# Patient Record
Sex: MALE | Race: WHITE | URBAN - METROPOLITAN AREA
[De-identification: names, ages, dates, MRNs, and addresses within clinical notes are randomized per-mention and may not be internally consistent; named-entity substitution may affect disease eponyms.]

---

## 2017-10-10 ENCOUNTER — OUTPATIENT (OUTPATIENT)
Dept: OUTPATIENT SERVICES | Facility: HOSPITAL | Age: 47
LOS: 1 days | End: 2017-10-10

## 2017-10-31 ENCOUNTER — OUTPATIENT (OUTPATIENT)
Dept: OUTPATIENT SERVICES | Facility: HOSPITAL | Age: 47
LOS: 1 days | End: 2017-10-31

## 2017-11-15 PROBLEM — Z00.00 ENCOUNTER FOR PREVENTIVE HEALTH EXAMINATION: Status: ACTIVE | Noted: 2017-11-15

## 2017-11-21 ENCOUNTER — OUTPATIENT (OUTPATIENT)
Dept: OUTPATIENT SERVICES | Facility: HOSPITAL | Age: 47
LOS: 1 days | End: 2017-11-21

## 2017-12-18 ENCOUNTER — RECORD ABSTRACTING (OUTPATIENT)
Age: 47
End: 2017-12-18

## 2017-12-19 ENCOUNTER — APPOINTMENT (OUTPATIENT)
Dept: CARDIOLOGY | Facility: CLINIC | Age: 47
End: 2017-12-19
Payer: COMMERCIAL

## 2017-12-19 VITALS
BODY MASS INDEX: 30.06 KG/M2 | WEIGHT: 210 LBS | SYSTOLIC BLOOD PRESSURE: 106 MMHG | HEIGHT: 70 IN | DIASTOLIC BLOOD PRESSURE: 72 MMHG | HEART RATE: 68 BPM

## 2017-12-19 DIAGNOSIS — Z78.9 OTHER SPECIFIED HEALTH STATUS: ICD-10-CM

## 2017-12-19 DIAGNOSIS — Z87.891 PERSONAL HISTORY OF NICOTINE DEPENDENCE: ICD-10-CM

## 2017-12-19 DIAGNOSIS — Z80.1 FAMILY HISTORY OF MALIGNANT NEOPLASM OF TRACHEA, BRONCHUS AND LUNG: ICD-10-CM

## 2017-12-19 PROCEDURE — 99244 OFF/OP CNSLTJ NEW/EST MOD 40: CPT

## 2018-01-05 ENCOUNTER — APPOINTMENT (OUTPATIENT)
Dept: CARDIOLOGY | Facility: CLINIC | Age: 48
End: 2018-01-05
Payer: COMMERCIAL

## 2018-01-05 PROCEDURE — 93306 TTE W/DOPPLER COMPLETE: CPT

## 2018-01-05 PROCEDURE — 93015 CV STRESS TEST SUPVJ I&R: CPT

## 2018-02-26 ENCOUNTER — APPOINTMENT (OUTPATIENT)
Dept: CARDIOLOGY | Facility: CLINIC | Age: 48
End: 2018-02-26
Payer: COMMERCIAL

## 2018-02-26 VITALS
HEIGHT: 70 IN | DIASTOLIC BLOOD PRESSURE: 70 MMHG | BODY MASS INDEX: 30.49 KG/M2 | SYSTOLIC BLOOD PRESSURE: 100 MMHG | WEIGHT: 213 LBS | HEART RATE: 60 BPM

## 2018-02-26 DIAGNOSIS — E66.3 OVERWEIGHT: ICD-10-CM

## 2018-02-26 PROCEDURE — 99214 OFFICE O/P EST MOD 30 MIN: CPT

## 2018-02-26 RX ORDER — ACETAMINOPHEN/DIPHENHYDRAMINE 500MG-25MG
TABLET ORAL
Refills: 0 | Status: ACTIVE | COMMUNITY

## 2018-02-26 RX ORDER — FLUTICASONE PROPIONATE 50 UG/1
50 SPRAY, METERED NASAL
Refills: 0 | Status: ACTIVE | COMMUNITY

## 2018-04-30 ENCOUNTER — APPOINTMENT (OUTPATIENT)
Dept: CARDIOLOGY | Facility: CLINIC | Age: 48
End: 2018-04-30
Payer: COMMERCIAL

## 2018-04-30 VITALS
SYSTOLIC BLOOD PRESSURE: 108 MMHG | DIASTOLIC BLOOD PRESSURE: 74 MMHG | HEART RATE: 72 BPM | HEIGHT: 70 IN | WEIGHT: 203 LBS | BODY MASS INDEX: 29.06 KG/M2

## 2018-04-30 DIAGNOSIS — I77.810 THORACIC AORTIC ECTASIA: ICD-10-CM

## 2018-04-30 DIAGNOSIS — R03.0 ELEVATED BLOOD-PRESSURE READING, W/OUT DIAGNOSIS OF HYPERTENSION: ICD-10-CM

## 2018-04-30 DIAGNOSIS — R94.31 ABNORMAL ELECTROCARDIOGRAM [ECG] [EKG]: ICD-10-CM

## 2018-04-30 PROCEDURE — 99213 OFFICE O/P EST LOW 20 MIN: CPT

## 2018-11-05 ENCOUNTER — APPOINTMENT (OUTPATIENT)
Dept: CARDIOLOGY | Facility: CLINIC | Age: 48
End: 2018-11-05

## 2024-11-20 ENCOUNTER — NON-APPOINTMENT (OUTPATIENT)
Age: 54
End: 2024-11-20

## 2024-12-05 ENCOUNTER — APPOINTMENT (OUTPATIENT)
Dept: RHEUMATOLOGY | Facility: CLINIC | Age: 54
End: 2024-12-05
Payer: COMMERCIAL

## 2024-12-05 VITALS
OXYGEN SATURATION: 97 % | DIASTOLIC BLOOD PRESSURE: 82 MMHG | TEMPERATURE: 97.9 F | WEIGHT: 201 LBS | BODY MASS INDEX: 28.77 KG/M2 | HEIGHT: 70 IN | HEART RATE: 60 BPM | SYSTOLIC BLOOD PRESSURE: 128 MMHG

## 2024-12-05 DIAGNOSIS — M54.89 OTHER DORSALGIA: ICD-10-CM

## 2024-12-05 DIAGNOSIS — G89.29 OTHER DORSALGIA: ICD-10-CM

## 2024-12-05 DIAGNOSIS — Z87.19 PERSONAL HISTORY OF OTHER DISEASES OF THE DIGESTIVE SYSTEM: ICD-10-CM

## 2024-12-05 DIAGNOSIS — R70.0 ELEVATED ERYTHROCYTE SEDIMENTATION RATE: ICD-10-CM

## 2024-12-05 DIAGNOSIS — M48.10 ANKYLOSING HYPEROSTOSIS [FORESTIER], SITE UNSPECIFIED: ICD-10-CM

## 2024-12-05 DIAGNOSIS — E78.5 HYPERLIPIDEMIA, UNSPECIFIED: ICD-10-CM

## 2024-12-05 DIAGNOSIS — M51.369: ICD-10-CM

## 2024-12-05 PROCEDURE — 99204 OFFICE O/P NEW MOD 45 MIN: CPT

## 2024-12-05 NOTE — PHYSICAL EXAM
[General Appearance - Alert] : alert [General Appearance - In No Acute Distress] : in no acute distress [Extraocular Movements] : extraocular movements were intact [FreeTextEntry1] : Tenderness over the trapezius areas Full active ROM in the bilateral upper extremity and at the hips

## 2024-12-05 NOTE — HISTORY OF PRESENT ILLNESS
[FreeTextEntry1] : The patient is a 54-year-old male with a PMH of prediabetes, DLD, GERD, cervical, thoracic, and lumbar DDD who presented to the office for an initial visit. The patient was referred by his PCP, Dr. Arnett.  He has been experiencing pain in his upper back area for the past 12 to 18 months.  This has been occurring intermittently.  He localized the pain during the shoulder blades and describe it as a muscle pain.  This has not limited him from performing his daily activities but has been uncomfortable.  He follows up with a spine specialist who obtained MRIs of his spine.  MRI of his cervical spine reported minimal spondylosis and small central disc protrusions.  MRI of his thoracic spine reported spondylosis and mild DISH from T6-T10.  He was advised to see a rheumatologist for further evaluation.  The patient subsequently saw his primary care physician who obtained lab tests which revealed an elevated ESR.  The patient denied having headaches, vision changes, unintentional weight loss, or new pain in his thighs or hips.  Of note, he has been experiencing an illness lately and is starting to feel better.  He reported being up-to-date with colon cancer screening.   Past Surgical History: nasal fracture repair Family History: No known family history of autoimmune diseases. Social History: former cigarette smoking (quit in this 30's); (+) social alcohol use

## 2024-12-05 NOTE — ASSESSMENT
[FreeTextEntry1] : A 54-year-old male with a PMH of prediabetes, DLD, GERD, cervical, thoracic, and lumbar DDD who presented to the office for an initial visit. The patient was referred by his PCP, Dr. Arnett.  He has been experiencing pain in his upper back area for the past 12 to 18 months.  This has been occurring intermittently.  He localized the pain during the shoulder blades and describe it as a muscle pain.  This has not limited him from performing his daily activities but has been uncomfortable.  He follows up with a spine specialist who obtained MRIs of his spine.  MRI of his cervical spine reported minimal spondylosis and small central disc protrusions.  MRI of his thoracic spine reported spondylosis and mild DISH from T6-T10.  He was advised to see a rheumatologist for further evaluation.  The patient subsequently saw his primary care physician who obtained lab tests which revealed an elevated ESR.  The patient denied having headaches, vision changes, unintentional weight loss, or new pain in his thighs or hips.  Of note, he has been experiencing an illness lately and is starting to feel better.  He reported being up-to-date with colon cancer screening.  The etiology of the patient's symptoms is unclear at this time. He was explained that DISH is an idiopathic degenerative disease which typically affects the spine and is usually seen in men over the age of 50.  It can be an incidental finding on radiographs or it can cause symptoms such as pain and stiffness along with reduced mobility.  It is unclear if the patient's upper back pain is related to DISH or a muscle spasm considering the straightening of normal lordosis in the upper cervical spine reported on MRI. The etiology of the patient's elevated ESR and CRP is unclear but could be related to his recent unspecified illness.  The patient was explained that ESR and CRP are nonspecific markers of inflammation and are not diagnostic of a rheumatologic disease.  He was also explained that elevated ESR and CRP levels can also be detected in states of chronic inflammation, infection and/or malignancy.  Plan: Obtain labs as ordered below Follow-up with spine specialist for further management Follow-up with PCP for further management  Return to office in 2 weeks or sooner as needed.

## 2024-12-05 NOTE — DATA REVIEWED
[FreeTextEntry1] : 10/22/24 at   MRI of cervical spine  IMPRESSION:  Minimal spondylosis as noted.  Small central disc protrusion at C3-C4, unchanged compared to MR images from 10/3/2019.  Very small central disc protrusion at C6-C7, without significant mass effect, minimally increased compared to MR images from October 2019.   MRI of thoracic spine  IMPRESSION:  There is mild DISH from T6 to T10.  Spondylosis is very minimal at T2-T3, T4-T5 and T5-T6. It is mild from T6 to T10, mild-to-moderate from T10 to T12 and very minimal at T12-L1.

## 2024-12-19 ENCOUNTER — TRANSCRIPTION ENCOUNTER (OUTPATIENT)
Age: 54
End: 2024-12-19

## 2025-01-02 ENCOUNTER — NON-APPOINTMENT (OUTPATIENT)
Age: 55
End: 2025-01-02